# Patient Record
Sex: FEMALE | Race: OTHER
[De-identification: names, ages, dates, MRNs, and addresses within clinical notes are randomized per-mention and may not be internally consistent; named-entity substitution may affect disease eponyms.]

---

## 2017-12-30 ENCOUNTER — HOSPITAL ENCOUNTER (EMERGENCY)
Dept: HOSPITAL 50 - VM.ED | Age: 47
Discharge: HOME | End: 2017-12-30
Payer: MEDICAID

## 2017-12-30 VITALS — SYSTOLIC BLOOD PRESSURE: 162 MMHG | DIASTOLIC BLOOD PRESSURE: 97 MMHG

## 2017-12-30 DIAGNOSIS — M47.894: ICD-10-CM

## 2017-12-30 DIAGNOSIS — M54.2: ICD-10-CM

## 2017-12-30 DIAGNOSIS — Z88.8: ICD-10-CM

## 2017-12-30 DIAGNOSIS — Z79.82: ICD-10-CM

## 2017-12-30 DIAGNOSIS — G89.18: Primary | ICD-10-CM

## 2017-12-30 DIAGNOSIS — F32.9: ICD-10-CM

## 2017-12-30 DIAGNOSIS — I10: ICD-10-CM

## 2017-12-30 DIAGNOSIS — Z88.5: ICD-10-CM

## 2017-12-30 DIAGNOSIS — Z79.899: ICD-10-CM

## 2017-12-30 DIAGNOSIS — F17.210: ICD-10-CM

## 2017-12-30 PROCEDURE — 72128 CT CHEST SPINE W/O DYE: CPT

## 2017-12-30 PROCEDURE — 96375 TX/PRO/DX INJ NEW DRUG ADDON: CPT

## 2017-12-30 PROCEDURE — 96374 THER/PROPH/DIAG INJ IV PUSH: CPT

## 2017-12-30 PROCEDURE — 96361 HYDRATE IV INFUSION ADD-ON: CPT

## 2017-12-30 PROCEDURE — 99284 EMERGENCY DEPT VISIT MOD MDM: CPT

## 2017-12-30 NOTE — EDM.PDOC
ED HPI GENERAL MEDICAL PROBLEM





- General


Chief Complaint: General


Stated Complaint: Headache; Neck Pain; s/p surgery


Time Seen by Provider: 12/30/17 17:57


Source of Information: Reports: Patient, Old Records, RN, RN Notes Reviewed


History Limitations: Reports: No Limitations





- History of Present Illness


INITIAL COMMENTS - FREE TEXT/NARRATIVE: 


Patient presents to the ED at [facility] complaining of nausea, neck 

numbness, left facial numbness, and headache. Patient recently underwent 

surgery on 12/21/2017 for Grade three and four glenohumeral arthritis with 

loose bodies. According to old records, surgery was uneventful. Patient states 

her symptoms began yesterday sometime and have progressively gotten worse. 

Patient admits to smoking Marijuana and doing Meth today. 


Onset: Gradual


Duration: Getting Worse


  ** Left Neck


Pain Score (Numeric/FACES): 8





- Related Data


 Allergies











Allergy/AdvReac Type Severity Reaction Status Date / Time


 


buspirone [From BuSpar] Allergy  Seizure Verified 12/30/17 18:01


 


tramadol Allergy  Seizure Verified 12/30/17 18:01











Home Meds: 


 Home Meds





Aspirin 81 mg PO DAILY 12/30/17 [History]


Divalproex Sodium 250 mg PO DAILY 12/30/17 [History]


Estradiol [Estradiol] 1 patch TOP WEEKLY 12/30/17 [History]


Hydrocodone/Acetaminophen [Hydrocodon-Acetaminophen 5-325] 1 each PO Q4H PRN 12/ 30/17 [History]


Ibuprofen [Motrin] 800 mg PO Q8H PRN 12/30/17 [History]


Ondansetron HCl [Ondansetron] 4 mg PO Q6H PRN 12/30/17 [History]


QUEtiapine [SEROquel] 50 - 100 mg PO BEDTIME 12/30/17 [History]


amLODIPine/Valsartan [Amlodipine-Valsartan 5-160 mg] 1 tab PO DAILY 12/30/17 [

History]


hydrOXYzine Pamoate [Hydroxyzine Pamoate] 25 mg PO Q6H PRN 12/30/17 [History]











Past Medical History


Cardiovascular History: Reports: Hypertension


Psychiatric History: Reports: Anxiety, Depression





- Past Surgical History


Female  Surgical History: Reports: Hysterectomy





Social & Family History





- Family History


Family Medical History: Noncontributory





- Tobacco Use


Smoking Status *Q: Current Every Day Smoker


Years of Tobacco use: 34


Packs/Tins Daily: 1





- Caffeine Use


Caffeine Use: Reports: None





- Recreational Drug Use


Recreational Drug Use: Yes


Drug Use in Last 12 Months: Yes


Recreational Drug Type: Reports: Marijuana/Hashish, Methamphetamine


Recreational Drug Use Frequency: Weekly





ED ROS GENERAL





- Review of Systems


Review Of Systems: See Below


Constitutional: Denies: Fever, Chills, Weakness


Respiratory: Denies: Shortness of Breath, Cough


Cardiovascular: Denies: Chest Pain, Palpitations


GI/Abdominal: Reports: Nausea.  Denies: Vomiting


Musculoskeletal: Reports: Neck Pain, Shoulder Pain, Muscle Pain, Other (See HPI)


Skin: Reports: No Symptoms


Neurological: Reports: Headache, Numbness.  Denies: Dizziness, Paresthesia, 

Tingling





ED EXAM, GENERAL





- Physical Exam


Exam: See Below


Exam Limited By: No Limitations


General Appearance: Alert, No Apparent Distress, Thin, Cachetic


Eye Exam: Bilateral Eye: EOMI, Normal Inspection, PERRL


Ears: Normal External Exam, Normal Canal, Normal TMs


Ear Exam: Bilateral Ear: TM normal


Head: Atraumatic, Normocephalic


Neck: Supple, Limited Range of Motion (due to pain), Tender Lateral (Left)


Respiratory/Chest: No Respiratory Distress, Lungs Clear, Normal Breath Sounds


Cardiovascular: Normal Peripheral Pulses, Regular Rate, Rhythm


Peripheral Pulses: 2+: Radial (L), Radial (R)


GI/Abdominal: Normal Bowel Sounds, Soft, Non-Tender


Back Exam: Normal Inspection, Paraspinal Tenderness


Extremities: Normal Inspection


Neurological: Alert, Oriented, Normal Cognition


Skin Exam: Warm, Dry, Intact, Normal Color, No Rash





Course





- Vital Signs


Last Recorded V/S: 


 Last Vital Signs











Temp  36.0 C   12/30/17 17:56


 


Pulse  80   12/30/17 17:56


 


Resp  20   12/30/17 17:56


 


BP  145/93 H  12/30/17 19:50


 


Pulse Ox  100   12/30/17 17:56














- Orders/Labs/Meds


Orders: 


 Active Orders 24 hr











 Category Date Time Status


 


 Cervical Spine wo Cont [CT] Stat Exams  12/30/17 18:09 Ordered


 


 Head wo Cont [CT] Stat Exams  12/30/17 18:09 Ordered


 


 Thoracic Spine wo Cont [CT] Stat Exams  12/30/17 18:09 Taken


 


 Sodium Chloride 0.9% [Saline Flush] Med  12/30/17 18:25 Active





 10 ml FLUSH ASDIRECTED PRN   


 


 Peripheral IV Insertion Adult [OM.PC] Routine Oth  12/30/17 18:25 Ordered








 Medication Orders





Sodium Chloride (Saline Flush)  10 ml FLUSH ASDIRECTED PRN


   PRN Reason: Keep Vein Open








Meds: 


Medications











Generic Name Dose Route Start Last Admin





  Trade Name Freq  PRN Reason Stop Dose Admin


 


Sodium Chloride  10 ml  12/30/17 18:25  





  Saline Flush  FLUSH   





  ASDIRECTED PRN   





  Keep Vein Open   














Discontinued Medications














Generic Name Dose Route Start Last Admin





  Trade Name Freq  PRN Reason Stop Dose Admin


 


Clonidine HCl  0.1 mg  12/30/17 18:38  12/30/17 18:42





  Catapres  PO  12/30/17 18:39  0.1 mg





  ONETIME ONE   Administration


 


Enalaprilat  1.25 mg  12/30/17 18:38  12/30/17 18:43





  Vasotec Iv  IVPUSH  12/30/17 18:39  1.25 mg





  ONETIME ONE   Administration


 


Lactated Ringer's  1,000 mls @ 999 mls/hr  12/30/17 18:26  12/30/17 18:33





  Ringers, Lactated  IV  12/30/17 19:26  999 mls/hr





  ONETIME ONE   Administration


 


Ondansetron HCl  4 mg  12/30/17 18:26  12/30/17 18:34





  Zofran  IVPUSH  12/30/17 18:27  4 mg





  ONETIME ONE   Administration


 


Prochlorperazine Edisylate  10 mg  12/30/17 19:43  12/30/17 19:46





  Compazine  IV  12/30/17 19:44  10 mg





  ONETIME ONE   Administration














- Radiology Interpretation


Free Text/Narrative:: 


CT Head: Normal examination of the brain


CT C-Spine: No acute findings in the cervical spine


CT T-Spine: Moderate changes of spondylosis diffusely throughout the thoracic 

spine without significant stenosis





See scanned reports in EMR


CT Results Date: 12/30/17


CT Results Time: 19:38





Departure





- Departure


Time of Disposition: 20:03


Disposition: Home, Self-Care 01


Condition: Good


Clinical Impression: 


 Postoperative pain of extremity








- Discharge Information


Instructions:  Pain Relief Preoperatively and Postoperatively


Referrals: 


Keenan Uriostegui PA-C [Primary Care Provider] - 


Forms:  ED Department Discharge


Additional Instructions: 


1. Stay well hydrated and rest


2. May alternate Tylenol/Advil as needed


3. If you need something stronger for pain, please call your surgeon or Primary 

provider


4. May apply heat/ice to sore uncomfortable areas


5. Keep follow up appointments with surgeon as scheduled


6. Call with any questions/concerns


7. CT scans were all normal





- Problem List Review


Problem List Initiated/Reviewed/Updated: Yes





- My Orders


Last 24 Hours: 


My Active Orders





12/30/17 18:09


Cervical Spine wo Cont [CT] Stat 


Head wo Cont [CT] Stat 


Thoracic Spine wo Cont [CT] Stat 





12/30/17 18:25


Sodium Chloride 0.9% [Saline Flush]   10 ml FLUSH ASDIRECTED PRN 


Peripheral IV Insertion Adult [OM.PC] Routine 














- Assessment/Plan


Last 24 Hours: 


My Active Orders





12/30/17 18:09


Cervical Spine wo Cont [CT] Stat 


Head wo Cont [CT] Stat 


Thoracic Spine wo Cont [CT] Stat 





12/30/17 18:25


Sodium Chloride 0.9% [Saline Flush]   10 ml FLUSH ASDIRECTED PRN 


Peripheral IV Insertion Adult [OM.PC] Routine

## 2018-06-26 ENCOUNTER — HOSPITAL ENCOUNTER (EMERGENCY)
Dept: HOSPITAL 50 - VM.ED | Age: 48
LOS: 1 days | Discharge: HOME | End: 2018-06-27
Payer: MEDICAID

## 2018-06-26 DIAGNOSIS — S20.212A: Primary | ICD-10-CM
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
## 2018-06-26 DIAGNOSIS — I10: ICD-10-CM

## 2018-06-26 DIAGNOSIS — Z79.899: ICD-10-CM

## 2018-06-26 DIAGNOSIS — Z79.82: ICD-10-CM

## 2018-06-26 DIAGNOSIS — Z88.8: ICD-10-CM

## 2018-06-26 PROCEDURE — 99283 EMERGENCY DEPT VISIT LOW MDM: CPT

## 2018-06-26 PROCEDURE — 71101 X-RAY EXAM UNILAT RIBS/CHEST: CPT
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
## 2018-06-27 VITALS — DIASTOLIC BLOOD PRESSURE: 100 MMHG | SYSTOLIC BLOOD PRESSURE: 172 MMHG

## 2019-03-28 ENCOUNTER — HOSPITAL ENCOUNTER (EMERGENCY)
Dept: HOSPITAL 50 - VM.ED | Age: 49
LOS: 1 days | Discharge: OTHER INSTITUTION | End: 2019-03-29
Payer: MEDICAID

## 2019-03-28 DIAGNOSIS — Z79.899: ICD-10-CM

## 2019-03-28 DIAGNOSIS — Z88.8: ICD-10-CM

## 2019-03-28 DIAGNOSIS — Z88.6: ICD-10-CM

## 2019-03-28 DIAGNOSIS — F41.9: ICD-10-CM

## 2019-03-28 DIAGNOSIS — F32.9: ICD-10-CM

## 2019-03-28 DIAGNOSIS — Z79.82: ICD-10-CM

## 2019-03-28 DIAGNOSIS — I10: Primary | ICD-10-CM

## 2019-03-28 PROCEDURE — 80053 COMPREHEN METABOLIC PANEL: CPT

## 2019-03-28 PROCEDURE — 83735 ASSAY OF MAGNESIUM: CPT

## 2019-03-28 PROCEDURE — 93005 ELECTROCARDIOGRAM TRACING: CPT

## 2019-03-28 PROCEDURE — 85025 COMPLETE CBC W/AUTO DIFF WBC: CPT

## 2019-03-28 PROCEDURE — 84443 ASSAY THYROID STIM HORMONE: CPT

## 2019-03-28 PROCEDURE — 84100 ASSAY OF PHOSPHORUS: CPT

## 2019-03-28 PROCEDURE — 84484 ASSAY OF TROPONIN QUANT: CPT

## 2019-03-28 PROCEDURE — 99284 EMERGENCY DEPT VISIT MOD MDM: CPT

## 2019-03-29 VITALS — DIASTOLIC BLOOD PRESSURE: 109 MMHG | SYSTOLIC BLOOD PRESSURE: 173 MMHG

## 2019-03-29 LAB
ANION GAP SERPL CALC-SCNC: 12.5 MMOL/L (ref 10–20)
CHLORIDE SERPL-SCNC: 106 MMOL/L (ref 98–107)
SODIUM SERPL-SCNC: 144 MMOL/L (ref 136–145)

## 2019-03-29 NOTE — EDM.PDOC
ED HPI GENERAL MEDICAL PROBLEM





- General


Chief Complaint: General


Stated Complaint: Hypertension, medical screening


Time Seen by Provider: 03/28/19 22:45


Source of Information: Reports: Patient


History Limitations: Reports: No Limitations





- History of Present Illness


INITIAL COMMENTS - FREE TEXT/NARRATIVE: 





Pt. was arrested tonight. She was found to be hypertensive on intake to the 

halfway and was sent to ER. Pt. states that her blood pressure always runs this 

high, often greater than 180/100 at home. She states that she uses 

methamphetamine regularly. Denies any headache. No chest pain or shortness of 

breath. No nausea, vomiting, or diarrhea.


She states that she took her medications this AM but states that she often 

forgets to take them. She is currently prescribed propranolol and triamterene/

HCTZ.


Pt. states that she has no complaints.


Onset Date: 03/28/19


  ** left lateral neck


Pain Score (Numeric/FACES): 5





- Related Data


 Allergies











Allergy/AdvReac Type Severity Reaction Status Date / Time


 


buspirone [From BuSpar] Allergy  Seizure Verified 03/29/19 01:19


 


tramadol Allergy  Seizure Verified 03/29/19 01:19











Home Meds: 


 Home Meds





Aspirin 81 mg PO DAILY 12/30/17 [History]


Divalproex Sodium 250 mg PO DAILY 12/30/17 [History]


Estradiol 1 patch TOP WEEKLY 12/30/17 [History]


Hydrocodone/Acetaminophen [Hydrocodon-Acetaminophen 5-325] 1 each PO Q4H PRN 12/ 30/17 [History]


Ibuprofen [Motrin] 800 mg PO Q8H PRN 12/30/17 [History]


Ondansetron HCl [Ondansetron] 4 mg PO Q6H PRN 12/30/17 [History]


QUEtiapine [SEROquel] 50 - 100 mg PO BEDTIME 12/30/17 [History]


amLODIPine/Valsartan [Amlodipine-Valsartan 5-160 mg] 1 tab PO DAILY 12/30/17 [

History]


hydrOXYzine pamoate [Hydroxyzine Pamoate] 25 mg PO Q6H PRN 12/30/17 [History]











Past Medical History


Cardiovascular History: Reports: Hypertension


Psychiatric History: Reports: Addiction, Anxiety, Depression


Other Psychiatric History: Drug abuse





- Past Surgical History


Female  Surgical History: Reports: Hysterectomy





Social & Family History





- Family History


Family Medical History: Noncontributory





- Tobacco Use


Smoking Status *Q: Unknown Ever Smoked





- Caffeine Use


Caffeine Use: Reports: None





- Recreational Drug Use


Recreational Drug Use: Yes


Drug Use in Last 12 Months: Yes


Recreational Drug Type: Reports: Methamphetamine


Other Recreational Drug Type: Drug abuse/meth





ED ROS GENERAL





- Review of Systems


Review Of Systems: See Below


Constitutional: Reports: No Symptoms


HEENT: Reports: No Symptoms


Respiratory: Reports: No Symptoms


Cardiovascular: Reports: Blood Pressure Problem


Endocrine: Reports: No Symptoms


GI/Abdominal: Reports: No Symptoms


: Reports: No Symptoms


Musculoskeletal: Reports: No Symptoms


Skin: Reports: No Symptoms


Neurological: Reports: No Symptoms


Psychiatric: Reports: No Symptoms


Hematologic/Lymphatic: Reports: No Symptoms


Immunologic: Reports: No Symptoms





ED EXAM, GENERAL





- Physical Exam


Exam: See Below


Exam Limited By: No Limitations


General Appearance: Alert, WD/WN, No Apparent Distress


Eye Exam: Bilateral Eye: EOMI, Normal Fundi, Normal Inspection, PERRL


Throat/Mouth: Normal Inspection, Normal Lips, Normal Teeth, Normal Gums, Normal 

Oropharynx, Normal Voice, No Airway Compromise


Head: Atraumatic, Normocephalic


Neck: Normal Inspection, Supple, Non-Tender, Full Range of Motion


Respiratory/Chest: No Respiratory Distress, Lungs Clear, Normal Breath Sounds, 

No Accessory Muscle Use, Chest Non-Tender


Cardiovascular: Normal Peripheral Pulses, Regular Rate, Rhythm, No Edema, No 

Gallop, No JVD, No Murmur, No Rub


GI/Abdominal: Normal Bowel Sounds, Soft, Non-Tender, No Organomegaly, No 

Distention, No Abnormal Bruit, No Mass


Extremities: Normal Inspection, Normal Range of Motion, Non-Tender, Normal 

Capillary Refill, No Pedal Edema


Neurological: Alert, Oriented, CN II-XII Intact, Normal Cognition, Normal Gait, 

Normal Reflexes, No Motor/Sensory Deficits





EKG INTERPRETATION


Rhythm: NSR


Axis: Normal


P-Wave: Present


QRS: Normal


ST-T: Normal


QT: Normal


Comparison: No Change





Course





- Vital Signs


Last Recorded V/S: 





 Last Vital Signs











Temp  36.1 C   03/28/19 23:40


 


Pulse  72   03/29/19 00:30


 


Resp  16   03/28/19 23:40


 


BP  173/109 H  03/29/19 00:30


 


Pulse Ox  97   03/28/19 23:40














- Orders/Labs/Meds


Orders: 





 Active Orders 24 hr











 Category Date Time Status


 


 EKG Documentation Completion [RC] STAT Care  03/28/19 23:09 Active


 


 Peripheral IV Insertion Adult [OM.PC] Routine Oth  03/28/19 23:09 Ordered











Labs: 





 Laboratory Tests











  03/28/19 03/28/19 Range/Units





  23:25 23:25 


 


WBC  6.7   (4.0-10.0)  x10^3/uL


 


RBC  4.59   (4.00-5.50)  x10^6/uL


 


Hgb  13.5   (12.0-16.0)  g/dL


 


Hct  40.9   (33.0-47.0)  %


 


MCV  89.1   (78.0-93.0)  fL


 


MCH  29.4   (26.0-32.0)  pg


 


MCHC  33.0   (32.0-36.0)  g/dL


 


RDW Coeff of Suzette  13.9   (10.0-15.0)  %


 


Plt Count  187   (130-400)  x10^3/uL


 


Neut % (Auto)  67.3   (50.0-80.0)  %


 


Lymph % (Auto)  24.5 L   (25.0-50.0)  %


 


Mono % (Auto)  6.5   (2.0-11.0)  %


 


Eos % (Auto)  1.4   (0.0-4.0)  %


 


Baso % (Auto)  0.3   (0.2-1.2)  %


 


Sodium   144  (136-145)  mmol/L


 


Potassium   3.5  (3.5-5.1)  mmol/L


 


Chloride   106  ()  mmol/L


 


Carbon Dioxide   29  (21-32)  mmol/L


 


Anion Gap   12.5  (10-20)  mmol/L


 


BUN   13  (7-18)  mg/dL


 


Creatinine   0.8  (0.55-1.02)  mg/dL


 


Est Cr Clr Drug Dosing   TNP  


 


Estimated GFR (MDRD)   > 60  


 


Glucose   113 H  ()  mg/dL


 


Calcium   9.1  (8.5-10.1)  mg/dL


 


Corrected Calcium   9.58  (8.5-10.1)  mg/dL


 


Phosphorus   4.0  (2.6-4.7)  mg/dL


 


Magnesium   2.1  (1.8-2.4)  mg/dL


 


Total Bilirubin   0.6  (0.2-1.0)  mg/dL


 


AST   26  (15-37)  U/L


 


ALT   40  (14-59)  U/L


 


Alkaline Phosphatase   109  ()  U/L


 


Troponin I   < 0.017  (<=0.056)  ng/mL


 


Total Protein   7.5  (6.4-8.2)  g/dL


 


Albumin   3.4  (3.4-5.0)  g/dL


 


Globulin   4.1  


 


Albumin/Globulin Ratio   0.83  


 


TSH, Ultra Sensitive   1.979  (0.358-3.74)  uIU/mL











Meds: 





Medications














Discontinued Medications














Generic Name Dose Route Start Last Admin





  Trade Name Freq  PRN Reason Stop Dose Admin


 


Clonidine HCl  0.1 mg  03/28/19 23:15  03/28/19 23:35





  Catapres  PO   0.1 mg





  DAILY NÉSTOR   Administration





     





     





     





     


 


Enalaprilat  1.25 mg  03/28/19 23:10  03/28/19 23:29





  Vasotec Iv  IVPUSH  03/28/19 23:11  1.25 mg





  ONETIME ONE   Administration





     





     





     





     


 


Sodium Chloride  10 ml  03/28/19 23:09  





  Saline Flush  FLUSH   





  ASDIRECTED PRN   





  Keep Vein Open   





     





     





     














- Re-Assessments/Exams


Free Text/Narrative Re-Assessment/Exam: 


Pt. was given 0.1mg clonidine and 1.25mg enalaprilat IV. Blood pressure did 

improve significantly. Will start lisinopril 10mg PO daily and have her follow-

up for recheck within the next week in the clinic. 





Departure





- Departure


Time of Disposition: 00:35


Disposition: DC/Tfer to Other 70


Clinical Impression: 


 Hypertension








- Discharge Information


Instructions:  Hypertension


Referrals: 


Keenan Uriostegui PA-C [Primary Care Provider] - 


Forms:  ED Department Discharge


Additional Instructions: 


Follow-up with PCP as soon as possible.





In addition to your other medications, start lisinopril 10mg once daily





Do not use methamphetamine as it increases your blood pressure





- My Orders


Last 24 Hours: 





My Active Orders





03/28/19 23:09


EKG Documentation Completion [RC] STAT 


Peripheral IV Insertion Adult [OM.PC] Routine 














- Assessment/Plan


Last 24 Hours: 





My Active Orders





03/28/19 23:09


EKG Documentation Completion [RC] STAT 


Peripheral IV Insertion Adult [OM.PC] Routine 











Plan: 





Follow-up with PCP as soon as possible.





In addition to your other medications, start lisinopril 10mg once daily





Do not use methamphetamine as it increases your blood pressure

## 2019-04-17 ENCOUNTER — HOSPITAL ENCOUNTER (EMERGENCY)
Dept: HOSPITAL 50 - VM.ED | Age: 49
Discharge: HOME | End: 2019-04-17
Payer: MEDICAID

## 2019-04-17 VITALS — SYSTOLIC BLOOD PRESSURE: 160 MMHG | DIASTOLIC BLOOD PRESSURE: 101 MMHG

## 2019-04-17 DIAGNOSIS — I10: ICD-10-CM

## 2019-04-17 DIAGNOSIS — Z79.899: ICD-10-CM

## 2019-04-17 DIAGNOSIS — F17.210: ICD-10-CM

## 2019-04-17 DIAGNOSIS — F32.9: ICD-10-CM

## 2019-04-17 DIAGNOSIS — Z88.6: ICD-10-CM

## 2019-04-17 DIAGNOSIS — F41.9: ICD-10-CM

## 2019-04-17 DIAGNOSIS — Z88.8: ICD-10-CM

## 2019-04-17 DIAGNOSIS — F15.10: Primary | ICD-10-CM

## 2019-04-17 LAB
ANION GAP SERPL CALC-SCNC: 12.3 MMOL/L (ref 10–20)
CHLORIDE SERPL-SCNC: 108 MMOL/L (ref 98–107)
SODIUM SERPL-SCNC: 145 MMOL/L (ref 136–145)

## 2019-04-17 PROCEDURE — 84100 ASSAY OF PHOSPHORUS: CPT

## 2019-04-17 PROCEDURE — 93005 ELECTROCARDIOGRAM TRACING: CPT

## 2019-04-17 PROCEDURE — 85025 COMPLETE CBC W/AUTO DIFF WBC: CPT

## 2019-04-17 PROCEDURE — 36415 COLL VENOUS BLD VENIPUNCTURE: CPT

## 2019-04-17 PROCEDURE — 86140 C-REACTIVE PROTEIN: CPT

## 2019-04-17 PROCEDURE — 80305 DRUG TEST PRSMV DIR OPT OBS: CPT

## 2019-04-17 PROCEDURE — 81001 URINALYSIS AUTO W/SCOPE: CPT

## 2019-04-17 PROCEDURE — 99283 EMERGENCY DEPT VISIT LOW MDM: CPT

## 2019-04-17 PROCEDURE — G0480 DRUG TEST DEF 1-7 CLASSES: HCPCS

## 2019-04-17 PROCEDURE — 84484 ASSAY OF TROPONIN QUANT: CPT

## 2019-04-17 PROCEDURE — 80053 COMPREHEN METABOLIC PANEL: CPT

## 2019-04-17 PROCEDURE — 85610 PROTHROMBIN TIME: CPT

## 2019-04-17 PROCEDURE — 83735 ASSAY OF MAGNESIUM: CPT

## 2019-04-17 NOTE — EDM.PDOC
ED HPI GENERAL MEDICAL PROBLEM





- General


Chief Complaint: General


Time Seen by Provider: 04/17/19 01:30


Source of Information: Reports: Patient


History Limitations: Reports: No Limitations





- History of Present Illness


INITIAL COMMENTS - FREE TEXT/NARRATIVE: 





Pt. presents to ER with complaints of fatigue, hypertension, and generally not 

feeling well. Pt. states that she has been experiencing this for several weeks. 

She was seen in the ER earlier this month to be cleared for incarceration and 

was found to be extremely hypertensive. At that time she reported that she uses 

meth regularly and states that she has been taking her blood pressure 

medications irregularly. She was started on Lisinopril in addition to her 

Propranolol ER 60mg and Triamterene/HCTZ 37.5/25mg. She states that she has 

been taking her medications regularly but states that her blood pressure has 

been very high the past few days. Her BP at home is 214/139 yesterday. 


Denies any chest pain or shortness of breath. No headache. No nausea, vomiting, 

or diarrhea. No numbness/tingling in her extremities. She now denies any drug 

or alcohol use.


Denies any recent illness.


Onset: Today


Onset Date: 04/17/19


Location: Reports: Generalized





- Related Data


 Allergies











Allergy/AdvReac Type Severity Reaction Status Date / Time


 


buspirone [From BuSpar] Allergy  Seizure Verified 04/17/19 01:25


 


tramadol Allergy  Seizure Verified 04/17/19 01:25











Home Meds: 


 Home Meds





Ibuprofen [Motrin] 800 mg PO Q8H PRN 12/30/17 [History]


Ondansetron HCl [Ondansetron] 4 mg PO Q6H PRN 12/30/17 [History]


QUEtiapine [SEROquel] 50 - 100 mg PO BEDTIME 12/30/17 [History]


ClonazePAM [KlonoPIN] 0.5 - 1 mg PO BID PRN 04/17/19 [History]


Eszopiclone [Lunesta] 3 mg PO BEDTIME 04/17/19 [History]


Meloxicam [Mobic] 7.5 mg PO DAILY 04/17/19 [History]


Omeprazole Magnesium [Prilosec Otc] 40 mg PO BID 04/17/19 [History]


Propranolol [Inderal LA 24 Hr] 60 mg PO DAILY 04/17/19 [History]


Ranitidine [Zantac] 150 mg PO BEDTIME 04/17/19 [History]


Triamterene/Hydrochlorothiazid [Dyazide 37.5-25] 1 cap PO DAILY 04/17/19 [

History]











Past Medical History


Cardiovascular History: Reports: Hypertension


Psychiatric History: Reports: Addiction, Anxiety, Depression


Other Psychiatric History: Drug abuse





- Past Surgical History


Female  Surgical History: Reports: Hysterectomy





Social & Family History





- Family History


Family Medical History: Noncontributory





- Tobacco Use


Smoking Status *Q: Current Every Day Smoker


Years of Tobacco use: 35


Packs/Tins Daily: 1





- Caffeine Use


Caffeine Use: Reports: None





- Recreational Drug Use


Recreational Drug Use: No





ED ROS GENERAL





- Review of Systems


Review Of Systems: See Below


Constitutional: Reports: No Symptoms


HEENT: Reports: No Symptoms


Respiratory: Reports: No Symptoms


Cardiovascular: Reports: No Symptoms


Endocrine: Reports: No Symptoms


GI/Abdominal: Reports: No Symptoms


: Reports: No Symptoms


Musculoskeletal: Reports: No Symptoms


Skin: Reports: No Symptoms


Neurological: Reports: Dizziness, Weakness.  Denies: Headache, Numbness, 

Paresthesia


Psychiatric: Reports: No Symptoms


Hematologic/Lymphatic: Reports: No Symptoms


Immunologic: Reports: No Symptoms





ED EXAM, GENERAL





- Physical Exam


Exam: See Below


Exam Limited By: No Limitations


General Appearance: Alert, WD/WN, No Apparent Distress


Eye Exam: Bilateral Eye: EOMI, PERRL


Head: Atraumatic, Normocephalic


Neck: Normal Inspection, Supple, Non-Tender, Full Range of Motion


Respiratory/Chest: No Respiratory Distress, Lungs Clear, Normal Breath Sounds, 

No Accessory Muscle Use, Chest Non-Tender


Cardiovascular: Normal Peripheral Pulses, Regular Rate, Rhythm, No Edema, No JVD


Peripheral Pulses: 4+: Radial (R)


GI/Abdominal: Soft, Non-Tender, No Organomegaly, No Mass


 (Female) Exam: Deferred


Rectal (Female) Exam: Deferred


Back Exam: Normal Inspection, Full Range of Motion


Extremities: Normal Inspection, Normal Range of Motion, Non-Tender, No Pedal 

Edema, Normal Capillary Refill


Neurological: Alert, Oriented, CN II-XII Intact, Normal Cognition, Normal Gait, 

Normal Reflexes, No Motor/Sensory Deficits


Psychiatric: Normal Affect, Normal Mood


Skin Exam: Warm, Dry, Intact, Normal Color, No Rash


Lymphatic: No Adenopathy





EKG INTERPRETATION


Rhythm: NSR


Axis: Normal


P-Wave: Present


QRS: Normal


ST-T: Normal


QT: Normal





Course





- Vital Signs


Last Recorded V/S: 


 Last Vital Signs











Temp  36.7 C   04/17/19 01:22


 


Pulse  81   04/17/19 01:22


 


Resp  14   04/17/19 01:22


 


BP  165/119 H  04/17/19 01:48


 


Pulse Ox  98   04/17/19 01:22














- Orders/Labs/Meds


Orders: 


 Active Orders 24 hr











 Category Date Time Status


 


 EKG Documentation Completion [RC] STAT Care  04/17/19 01:31 Active


 


 CBC WITH AUTO DIFF [HEME] Stat Lab  04/17/19 01:30 Ordered


 


 COMPREHENSIVE METABOLIC PN,CMP [CHEM] Stat Lab  04/17/19 01:30 Ordered


 


 CRP [C-REACTIVE PROTEIN] [CHEM] Stat Lab  04/17/19 01:30 Ordered


 


 DRUG SCREEN, URINE [URCHEM] Stat Lab  04/17/19 01:54 Ordered


 


 ETOH [ETHANOL BLOOD MEDICAL] [CHEM] Stat Lab  04/17/19 01:37 Ordered


 


 INR,PT,PROTHROMBIN TIME [COAG] Stat Lab  04/17/19 01:30 Ordered


 


 MAGNESIUM [CHEM] Stat Lab  04/17/19 01:30 Ordered


 


 PHOSPHORUS [CHEM] Stat Lab  04/17/19 01:30 Ordered


 


 TROPONIN I [CHEM] Stat Lab  04/17/19 01:30 Ordered


 


 UA W/MICROSCOPIC [URIN] Stat Lab  04/17/19 01:54 Ordered











Meds: 


Medications














Discontinued Medications














Generic Name Dose Route Start Last Admin





  Trade Name Freq  PRN Reason Stop Dose Admin


 


Clonidine HCl  0.1 mg  04/17/19 01:36  04/17/19 01:48





  Catapres  PO  04/17/19 01:37  0.1 mg





  ONETIME ONE   Administration





     





     





     





     














Departure





- Departure


Time of Disposition: 02:38


Disposition: Home, Self-Care 01


Clinical Impression: 


 Methamphetamine abuse, Hypertension








- Discharge Information


Forms:  ED Department Discharge





- My Orders


Last 24 Hours: 


My Active Orders





04/17/19 01:30


CBC WITH AUTO DIFF [HEME] Stat 


COMPREHENSIVE METABOLIC PN,CMP [CHEM] Stat 


CRP [C-REACTIVE PROTEIN] [CHEM] Stat 


INR,PT,PROTHROMBIN TIME [COAG] Stat 


MAGNESIUM [CHEM] Stat 


PHOSPHORUS [CHEM] Stat 


TROPONIN I [CHEM] Stat 





04/17/19 01:31


EKG Documentation Completion [RC] STAT 





04/17/19 01:37


ETOH [ETHANOL BLOOD MEDICAL] [CHEM] Stat 





04/17/19 01:54


DRUG SCREEN, URINE [URCHEM] Stat 


UA W/MICROSCOPIC [URIN] Stat 














- Assessment/Plan


Last 24 Hours: 


My Active Orders





04/17/19 01:30


CBC WITH AUTO DIFF [HEME] Stat 


COMPREHENSIVE METABOLIC PN,CMP [CHEM] Stat 


CRP [C-REACTIVE PROTEIN] [CHEM] Stat 


INR,PT,PROTHROMBIN TIME [COAG] Stat 


MAGNESIUM [CHEM] Stat 


PHOSPHORUS [CHEM] Stat 


TROPONIN I [CHEM] Stat 





04/17/19 01:31


EKG Documentation Completion [RC] STAT 





04/17/19 01:37


ETOH [ETHANOL BLOOD MEDICAL] [CHEM] Stat 





04/17/19 01:54


DRUG SCREEN, URINE [URCHEM] Stat 


UA W/MICROSCOPIC [URIN] Stat 











Plan: 





Pt. did test positive for meth. Advised her to stop using this as it inhibits 

her ability to sleep. She was advised to use take her antihypertensives at the 

same time every day. Discussed sleep hygiene and the importance of getting 8 

hours of sleep, the circadian rhythm, etc. She was advised to follow-up with 

her PCP in 2 weeks for recheck.

## 2019-04-26 ENCOUNTER — HOSPITAL ENCOUNTER (EMERGENCY)
Dept: HOSPITAL 50 - VM.ED | Age: 49
Discharge: HOME | End: 2019-04-26
Payer: MEDICAID

## 2019-04-26 VITALS — SYSTOLIC BLOOD PRESSURE: 158 MMHG | DIASTOLIC BLOOD PRESSURE: 107 MMHG

## 2019-04-26 DIAGNOSIS — Z79.899: ICD-10-CM

## 2019-04-26 DIAGNOSIS — F17.210: ICD-10-CM

## 2019-04-26 DIAGNOSIS — Z88.8: ICD-10-CM

## 2019-04-26 DIAGNOSIS — F41.9: ICD-10-CM

## 2019-04-26 DIAGNOSIS — Z88.5: ICD-10-CM

## 2019-04-26 DIAGNOSIS — F32.9: ICD-10-CM

## 2019-04-26 DIAGNOSIS — I10: Primary | ICD-10-CM

## 2019-04-26 LAB
ANION GAP SERPL CALC-SCNC: 12.3 MMOL/L (ref 10–20)
CHLORIDE SERPL-SCNC: 104 MMOL/L (ref 98–107)
SODIUM SERPL-SCNC: 144 MMOL/L (ref 136–145)

## 2019-04-26 PROCEDURE — 83735 ASSAY OF MAGNESIUM: CPT

## 2019-04-26 PROCEDURE — 80305 DRUG TEST PRSMV DIR OPT OBS: CPT

## 2019-04-26 PROCEDURE — 99284 EMERGENCY DEPT VISIT MOD MDM: CPT

## 2019-04-26 PROCEDURE — 80053 COMPREHEN METABOLIC PANEL: CPT

## 2019-04-26 PROCEDURE — 84443 ASSAY THYROID STIM HORMONE: CPT

## 2019-04-26 PROCEDURE — 93005 ELECTROCARDIOGRAM TRACING: CPT

## 2019-04-26 PROCEDURE — 83880 ASSAY OF NATRIURETIC PEPTIDE: CPT

## 2019-04-26 PROCEDURE — 85610 PROTHROMBIN TIME: CPT

## 2019-04-26 PROCEDURE — 81001 URINALYSIS AUTO W/SCOPE: CPT

## 2019-04-26 PROCEDURE — 84484 ASSAY OF TROPONIN QUANT: CPT

## 2019-04-26 PROCEDURE — G0480 DRUG TEST DEF 1-7 CLASSES: HCPCS

## 2019-04-26 PROCEDURE — 86140 C-REACTIVE PROTEIN: CPT

## 2019-04-26 PROCEDURE — 96374 THER/PROPH/DIAG INJ IV PUSH: CPT

## 2019-04-26 PROCEDURE — 84100 ASSAY OF PHOSPHORUS: CPT

## 2019-04-26 PROCEDURE — 85025 COMPLETE CBC W/AUTO DIFF WBC: CPT

## 2019-04-26 NOTE — EDM.PDOC
ED HPI GENERAL MEDICAL PROBLEM





- General


Chief Complaint: Cardiovascular Problem


Time Seen by Provider: 04/26/19 11:10


Source of Information: Reports: Patient


History Limitations: Reports: No Limitations





- History of Present Illness


INITIAL COMMENTS - FREE TEXT/NARRATIVE: 





Pt. presents to ER with complaints of depression, suicidal thoughts without plan

, fatigue, and hypertension. She was seen in ER for fatigue and hypertension on 

4/17 and was found to be extremely hypertensive at that time. She had been non-

compliant with her medications at that time and tested positive for 

methamphetamine which she affirms she did not take. She states today that she 

thinks her daughter has been putting meth in her coffee. 


Pt. states that she has been short of breath. She complains of chest pain and 

headache, body aches.


She states that she has been taking one of her medications regularly, she 

thinks the triamterene/HCTZ. She has not been taking the propranolol or 

lisinopril. She was seen in the clinic on the 24th and her BP at that time was 

152/104.


She has had issues with insomnia. She is sleepy during the day and often awake 

at night due to stress with her daughter and boyfriend. She states that her 

daughter has been trading sex for drugs with her boyfriend. Her daughter has 

since been kicked out of the house which has been stressful for her.


Pt. contacted the [facility] [place] and one of their case 

workers accompanies the patient to ER today. She is arranging for her to stay 

at the [facility] in [place] until things have stabilized around home. She will also 

be put in contact with mental health professionals to address her depression 

and anxiety.


Onset: Today


Onset Date: 04/26/19


  ** Headache


Pain Score (Numeric/FACES): 9





- Related Data


 Allergies











Allergy/AdvReac Type Severity Reaction Status Date / Time


 


buspirone [From BuSpar] Allergy  Seizure Verified 04/26/19 11:38


 


tramadol Allergy  Seizure Verified 04/26/19 11:38











Home Meds: 


 Home Meds





Ibuprofen [Motrin] 800 mg PO Q8H PRN 12/30/17 [History]


Ondansetron HCl [Ondansetron] 4 mg PO Q6H PRN 12/30/17 [History]


QUEtiapine [SEROquel] 50 - 100 mg PO BEDTIME 12/30/17 [History]


ClonazePAM [KlonoPIN] 0.5 - 1 mg PO BID PRN 04/17/19 [History]


Eszopiclone [Lunesta] 3 mg PO BEDTIME 04/17/19 [History]


Meloxicam [Mobic] 7.5 mg PO DAILY 04/17/19 [History]


Omeprazole Magnesium [Prilosec Otc] 40 mg PO BID 04/17/19 [History]


Propranolol [Inderal LA 24 Hr] 60 mg PO DAILY 04/17/19 [History]


Ranitidine [Zantac] 150 mg PO BEDTIME 04/17/19 [History]


Triamterene/Hydrochlorothiazid [Dyazide 37.5-25] 1 cap PO DAILY 04/17/19 [

History]











Past Medical History


Cardiovascular History: Reports: Hypertension


Psychiatric History: Reports: Addiction, Anxiety, Depression


Other Psychiatric History: Drug abuse





- Past Surgical History


Female  Surgical History: Reports: Hysterectomy





Social & Family History





- Family History


Family Medical History: Noncontributory





- Tobacco Use


Smoking Status *Q: Current Every Day Smoker


Years of Tobacco use: 35


Packs/Tins Daily: 0.5





- Caffeine Use


Caffeine Use: Reports: None





- Recreational Drug Use


Recreational Drug Use: No





ED ROS GENERAL





- Review of Systems


Review Of Systems: See Below


Constitutional: Reports: Malaise, Fatigue, Decreased Appetite


HEENT: Reports: No Symptoms


Respiratory: Reports: No Symptoms


Cardiovascular: Reports: No Symptoms


Endocrine: Reports: No Symptoms


GI/Abdominal: Reports: No Symptoms


: Reports: No Symptoms


Musculoskeletal: Reports: No Symptoms


Skin: Reports: No Symptoms


Neurological: Reports: Headache.  Denies: Paresthesia, Seizure, Syncope


Psychiatric: Reports: Depression, Suicidal Ideation


Hematologic/Lymphatic: Reports: No Symptoms


Immunologic: Reports: No Symptoms





ED EXAM, GENERAL





- Physical Exam


Exam: See Below


Exam Limited By: No Limitations


General Appearance: Alert, WD/WN, No Apparent Distress


Neck: Normal Inspection, Supple, Non-Tender, Full Range of Motion


Respiratory/Chest: No Respiratory Distress, Lungs Clear, Normal Breath Sounds, 

No Accessory Muscle Use, Chest Non-Tender


Cardiovascular: Normal Peripheral Pulses, Regular Rate, Rhythm, No Edema, No 

Gallop, No JVD, No Murmur, No Rub


Peripheral Pulses: 4+: Radial (R)


GI/Abdominal: Soft, Non-Tender, No Organomegaly, No Distention, No Mass


 (Female) Exam: Deferred


Rectal (Female) Exam: Deferred


Back Exam: Normal Inspection, Full Range of Motion


Extremities: Normal Inspection, Normal Range of Motion, Non-Tender, No Pedal 

Edema, Normal Capillary Refill


Neurological: Alert, Oriented, CN II-XII Intact, Normal Cognition, Normal Gait, 

Normal Reflexes, No Motor/Sensory Deficits


Psychiatric: Normal Affect, Normal Mood


Skin Exam: Warm, Dry, Intact, Normal Color, No Rash





Course





- Vital Signs


Last Recorded V/S: 


 Last Vital Signs











Temp  36.0 C   04/26/19 11:00


 


Pulse  74   04/26/19 11:00


 


Resp  16   04/26/19 11:00


 


BP  171/105 H  04/26/19 12:26


 


Pulse Ox  99   04/26/19 11:00














- Orders/Labs/Meds


Orders: 


 Active Orders 24 hr











 Category Date Time Status


 


 EKG Documentation Completion [RC] STAT Care  04/26/19 11:11 Active


 


 Sodium Chloride 0.9% [Saline Flush] Med  04/26/19 11:11 Active





 10 ml FLUSH ASDIRECTED PRN   


 


 Peripheral IV Insertion Adult [OM.PC] Routine Oth  04/26/19 11:11 Ordered








 Medication Orders





Sodium Chloride (Saline Flush)  10 ml FLUSH ASDIRECTED PRN


   PRN Reason: Keep Vein Open








Labs: 


 Laboratory Tests











  04/26/19 04/26/19 04/26/19 Range/Units





  11:10 11:10 11:10 


 


WBC  8.8    (4.0-10.0)  x10^3/uL


 


RBC  4.82    (4.00-5.50)  x10^6/uL


 


Hgb  14.2  D    (12.0-16.0)  g/dL


 


Hct  43.0    (33.0-47.0)  %


 


MCV  89.2    (78.0-93.0)  fL


 


MCH  29.5    (26.0-32.0)  pg


 


MCHC  33.0    (32.0-36.0)  g/dL


 


RDW Coeff of Suzette  13.5    (10.0-15.0)  %


 


Plt Count  213    (130-400)  x10^3/uL


 


Neut % (Auto)  73.0    (50.0-80.0)  %


 


Lymph % (Auto)  20.3 L    (25.0-50.0)  %


 


Mono % (Auto)  4.5    (2.0-11.0)  %


 


Eos % (Auto)  1.9    (0.0-4.0)  %


 


Baso % (Auto)  0.3    (0.2-1.2)  %


 


PT   9.6 L   (10.0-12.8)  SEC


 


INR   0.8 L   (2.0-3.5)  


 


Sodium    144  (136-145)  mmol/L


 


Potassium    3.3 L  (3.5-5.1)  mmol/L


 


Chloride    104  ()  mmol/L


 


Carbon Dioxide    31  (21-32)  mmol/L


 


Anion Gap    12.3  (10-20)  mmol/L


 


BUN    21 H  (7-18)  mg/dL


 


Creatinine    0.8  (0.55-1.02)  mg/dL


 


Est Cr Clr Drug Dosing    87.15  mL/min


 


Estimated GFR (MDRD)    > 60  


 


Glucose    82  ()  mg/dL


 


Calcium    8.5  (8.5-10.1)  mg/dL


 


Corrected Calcium    8.90  (8.5-10.1)  mg/dL


 


Phosphorus    3.3  (2.6-4.7)  mg/dL


 


Magnesium    2.0  (1.8-2.4)  mg/dL


 


Total Bilirubin    0.5  (0.2-1.0)  mg/dL


 


AST    20  (15-37)  U/L


 


ALT    33  (14-59)  U/L


 


Alkaline Phosphatase    110  ()  U/L


 


Troponin I    < 0.017  (<=0.056)  ng/mL


 


C-Reactive Protein    0.3  (<=0.9)  mg/dL


 


NT-Pro-B Natriuret Pep    172 H  (<=125)  pg/mL


 


Total Protein    8.4 H  (6.4-8.2)  g/dL


 


Albumin    3.5  (3.4-5.0)  g/dL


 


Globulin    4.9  


 


Albumin/Globulin Ratio    0.71  


 


TSH, Ultra Sensitive    0.797  (0.358-3.74)  uIU/mL


 


Urine Color     (YELLOW)  


 


Urine Appearance     (CLEAR)  


 


Urine pH     (5.0-8.0)  


 


Ur Specific Gravity     


 


Urine Protein     (NEGATIVE)  mg/dL


 


Urine Glucose (UA)     (NEGATIVE)  mg/dL


 


Urine Ketones     (NEGATIVE)  mg/dL


 


Urine Occult Blood     (NEGATIVE)  


 


Urine Nitrite     (NEGATIVE)  


 


Urine Bilirubin     (NEGATIVE)  


 


Urine Urobilinogen     (0.2)  EU/dL


 


Ur Leukocyte Esterase     (NEGATIVE)  


 


Urine RBC     (NOT SEEN)  /HPF


 


Urine WBC     (NOT SEEN)  /HPF


 


Ur Squamous Epith Cells     (NEGATIVE)  /HPF


 


Urine Bacteria     (NEGATIVE)  /HPF


 


Urine Mucus     (NEGATIVE)  /LPF


 


Urine Opiates Screen     (NEGATIVE)  


 


Ur Buprenorphine Scrn     (NEGATIVE)  


 


Ur Oxycodone Screen     (NEGATIVE)  


 


Urine Methadone Screen     (NEGATIVE)  


 


Ur Barbiturates Screen     (NEGATIVE)  


 


Ur Tricyclics Screen     (NEGATIVE)  


 


Ur Amphetamine Screen     (NEGATIVE)  


 


U Methamphetamines Scrn     (NEGATIVE)  


 


Urine MDMA Screen     (NEGATIVE)  


 


U Benzodiazepines Scrn     (NEGATIVE)  


 


U Cocaine Metab Screen     (NEGATIVE)  


 


U Marijuana (THC) Screen     (NEGATIVE)  


 


Ethyl Alcohol    < 3  (0-3)  mg/dL














  04/26/19 04/26/19 Range/Units





  11:10 11:20 


 


WBC    (4.0-10.0)  x10^3/uL


 


RBC    (4.00-5.50)  x10^6/uL


 


Hgb    (12.0-16.0)  g/dL


 


Hct    (33.0-47.0)  %


 


MCV    (78.0-93.0)  fL


 


MCH    (26.0-32.0)  pg


 


MCHC    (32.0-36.0)  g/dL


 


RDW Coeff of Suzette    (10.0-15.0)  %


 


Plt Count    (130-400)  x10^3/uL


 


Neut % (Auto)    (50.0-80.0)  %


 


Lymph % (Auto)    (25.0-50.0)  %


 


Mono % (Auto)    (2.0-11.0)  %


 


Eos % (Auto)    (0.0-4.0)  %


 


Baso % (Auto)    (0.2-1.2)  %


 


PT    (10.0-12.8)  SEC


 


INR    (2.0-3.5)  


 


Sodium    (136-145)  mmol/L


 


Potassium    (3.5-5.1)  mmol/L


 


Chloride    ()  mmol/L


 


Carbon Dioxide    (21-32)  mmol/L


 


Anion Gap    (10-20)  mmol/L


 


BUN    (7-18)  mg/dL


 


Creatinine    (0.55-1.02)  mg/dL


 


Est Cr Clr Drug Dosing    mL/min


 


Estimated GFR (MDRD)    


 


Glucose    ()  mg/dL


 


Calcium    (8.5-10.1)  mg/dL


 


Corrected Calcium    (8.5-10.1)  mg/dL


 


Phosphorus    (2.6-4.7)  mg/dL


 


Magnesium    (1.8-2.4)  mg/dL


 


Total Bilirubin    (0.2-1.0)  mg/dL


 


AST    (15-37)  U/L


 


ALT    (14-59)  U/L


 


Alkaline Phosphatase    ()  U/L


 


Troponin I    (<=0.056)  ng/mL


 


C-Reactive Protein    (<=0.9)  mg/dL


 


NT-Pro-B Natriuret Pep    (<=125)  pg/mL


 


Total Protein    (6.4-8.2)  g/dL


 


Albumin    (3.4-5.0)  g/dL


 


Globulin    


 


Albumin/Globulin Ratio    


 


TSH, Ultra Sensitive    (0.358-3.74)  uIU/mL


 


Urine Color  Yellow   (YELLOW)  


 


Urine Appearance  Clear   (CLEAR)  


 


Urine pH  7.0   (5.0-8.0)  


 


Ur Specific Gravity  1.015   


 


Urine Protein  Negative   (NEGATIVE)  mg/dL


 


Urine Glucose (UA)  Negative   (NEGATIVE)  mg/dL


 


Urine Ketones  Negative   (NEGATIVE)  mg/dL


 


Urine Occult Blood  Trace-intact H   (NEGATIVE)  


 


Urine Nitrite  Negative   (NEGATIVE)  


 


Urine Bilirubin  Negative   (NEGATIVE)  


 


Urine Urobilinogen  0.2   (0.2)  EU/dL


 


Ur Leukocyte Esterase  Negative   (NEGATIVE)  


 


Urine RBC  Not seen   (NOT SEEN)  /HPF


 


Urine WBC  0-5   (NOT SEEN)  /HPF


 


Ur Squamous Epith Cells  Few H   (NEGATIVE)  /HPF


 


Urine Bacteria  Rare   (NEGATIVE)  /HPF


 


Urine Mucus  Few H   (NEGATIVE)  /LPF


 


Urine Opiates Screen   Negative  (NEGATIVE)  


 


Ur Buprenorphine Scrn   Negative  (NEGATIVE)  


 


Ur Oxycodone Screen   Negative  (NEGATIVE)  


 


Urine Methadone Screen   Negative  (NEGATIVE)  


 


Ur Barbiturates Screen   Negative  (NEGATIVE)  


 


Ur Tricyclics Screen   Negative  (NEGATIVE)  


 


Ur Amphetamine Screen   Negative  (NEGATIVE)  


 


U Methamphetamines Scrn   Negative  (NEGATIVE)  


 


Urine MDMA Screen   Negative  (NEGATIVE)  


 


U Benzodiazepines Scrn   Positive H  (NEGATIVE)  


 


U Cocaine Metab Screen   Negative  (NEGATIVE)  


 


U Marijuana (THC) Screen   Negative  (NEGATIVE)  


 


Ethyl Alcohol    (0-3)  mg/dL











Meds: 


Medications











Generic Name Dose Route Start Last Admin





  Trade Name Freq  PRN Reason Stop Dose Admin


 


Sodium Chloride  10 ml  04/26/19 11:11  





  Saline Flush  FLUSH   





  ASDIRECTED PRN   





  Keep Vein Open   





     





     





     














Discontinued Medications














Generic Name Dose Route Start Last Admin





  Trade Name Sergioq  PRN Reason Stop Dose Admin


 


Clonidine HCl  0.1 mg  04/26/19 11:19  04/26/19 11:28





  Catapres  PO  04/26/19 11:20  0.1 mg





  ONETIME ONE   Administration





     





     





     





     


 


Labetalol HCl  20 mg  04/26/19 11:19  04/26/19 11:28





  Normodyne  IVPUSH  04/26/19 11:20  20 mg





  NOW ONE   Administration





     





     





  Protocol   





     














Departure





- Departure


Time of Disposition: 12:40


Disposition: DC/[facility] to [facility]


Reason for Transfer *Q: Other


Clinical Impression: 


 Hypertension





Instructions:  Hypertension


Referrals: 


Kenean Uriostegui PA-C [Primary Care Provider] - 


Forms:  ED Department Discharge


Additional Instructions: 


Triamterene/HCTZ 37.5mg/25mg once daily





Propranolol 60mg once daily





Follow-up for blood pressure recheck in 1 week to see if you need to be on a 

third agent for BP.





Work on your sleep pattern as we discussed before. Do not nap during the day. 

Try to go to sleep at the same time every night. Use your lunesta as needed.





- Problem List Review


Problem List Initiated/Reviewed/Updated: Yes





- My Orders


Last 24 Hours: 


My Active Orders





04/26/19 11:11


EKG Documentation Completion [RC] STAT 


Sodium Chloride 0.9% [Saline Flush]   10 ml FLUSH ASDIRECTED PRN 


Peripheral IV Insertion Adult [OM.PC] Routine 














- Assessment/Plan


Last 24 Hours: 


My Active Orders





04/26/19 11:11


EKG Documentation Completion [RC] STAT 


Sodium Chloride 0.9% [Saline Flush]   10 ml FLUSH ASDIRECTED PRN 


Peripheral IV Insertion Adult [OM.PC] Routine 











Plan: 


Pt. will be transported to [facility] in [place].





Triamterene/HCTZ 37.5mg/25mg once daily





Propranolol 60mg once daily





She should follow-up in the clinic in 7-10 days for repeat BP check. I am not 

starting a third agent because she was not taking one if the agents she is 

already prescribed.

## 2020-08-12 ENCOUNTER — HOSPITAL ENCOUNTER (EMERGENCY)
Dept: HOSPITAL 50 - VM.ED | Age: 50
Discharge: HOME | End: 2020-08-12
Payer: MEDICAID

## 2020-08-12 VITALS — SYSTOLIC BLOOD PRESSURE: 158 MMHG | DIASTOLIC BLOOD PRESSURE: 102 MMHG

## 2020-08-12 VITALS — HEART RATE: 81 BPM

## 2020-08-12 DIAGNOSIS — K02.9: ICD-10-CM

## 2020-08-12 DIAGNOSIS — Z20.828: ICD-10-CM

## 2020-08-12 DIAGNOSIS — F32.9: ICD-10-CM

## 2020-08-12 DIAGNOSIS — K05.20: ICD-10-CM

## 2020-08-12 DIAGNOSIS — F15.10: ICD-10-CM

## 2020-08-12 DIAGNOSIS — Z90.710: ICD-10-CM

## 2020-08-12 DIAGNOSIS — F41.9: ICD-10-CM

## 2020-08-12 DIAGNOSIS — Z88.8: ICD-10-CM

## 2020-08-12 DIAGNOSIS — Z79.899: ICD-10-CM

## 2020-08-12 DIAGNOSIS — I10: ICD-10-CM

## 2020-08-12 DIAGNOSIS — Z88.5: ICD-10-CM

## 2020-08-12 DIAGNOSIS — R07.9: Primary | ICD-10-CM

## 2020-08-12 LAB
ANION GAP SERPL CALC-SCNC: 8.2 MMOL/L (ref 10–20)
BARBITURATES UR QL SCN: NEGATIVE
BENZODIAZ UR QL SCN: NEGATIVE
CHLORIDE SERPL-SCNC: 104 MMOL/L (ref 98–107)
EDDP,URINE SCREEN: NEGATIVE
METHADONE UR QL SCN: POSITIVE
SODIUM SERPL-SCNC: 140 MMOL/L (ref 136–145)
TCA SCREEN,URINE: NEGATIVE
THC UR QL SCN>50 NG/ML: POSITIVE

## 2020-08-12 PROCEDURE — 85025 COMPLETE CBC W/AUTO DIFF WBC: CPT

## 2020-08-12 PROCEDURE — 99284 EMERGENCY DEPT VISIT MOD MDM: CPT

## 2020-08-12 PROCEDURE — 80305 DRUG TEST PRSMV DIR OPT OBS: CPT

## 2020-08-12 PROCEDURE — 87635 SARS-COV-2 COVID-19 AMP PRB: CPT

## 2020-08-12 PROCEDURE — U0002 COVID-19 LAB TEST NON-CDC: HCPCS

## 2020-08-12 PROCEDURE — 83735 ASSAY OF MAGNESIUM: CPT

## 2020-08-12 PROCEDURE — 93010 ELECTROCARDIOGRAM REPORT: CPT

## 2020-08-12 PROCEDURE — 81001 URINALYSIS AUTO W/SCOPE: CPT

## 2020-08-12 PROCEDURE — 84484 ASSAY OF TROPONIN QUANT: CPT

## 2020-08-12 PROCEDURE — 85610 PROTHROMBIN TIME: CPT

## 2020-08-12 PROCEDURE — 96374 THER/PROPH/DIAG INJ IV PUSH: CPT

## 2020-08-12 PROCEDURE — 99285 EMERGENCY DEPT VISIT HI MDM: CPT

## 2020-08-12 PROCEDURE — 93005 ELECTROCARDIOGRAM TRACING: CPT

## 2020-08-12 PROCEDURE — 80053 COMPREHEN METABOLIC PANEL: CPT

## 2020-08-12 RX ADMIN — Medication PRN ML: at 16:30

## 2020-08-12 NOTE — EDM.PDOC
ED HPI GENERAL MEDICAL PROBLEM





- General


Stated Complaint: SOB, CHEST PAIN


Time Seen by Provider: 08/12/20 15:59


Source of Information: Reports: Patient


History Limitations: Reports: No Limitations





- History of Present Illness


INITIAL COMMENTS - FREE TEXT/NARRATIVE: 





Pt. presents to ER with numerous complaints. Her primary complaint is of chest 

pain and hypertension. Pt. admits to starting to use methamphetamine again, and 

previously had not been using it. She states that she has been experiencing 

intermittent chest discomfort for over a month. Pt. BP has been elevated in 

clinic (180/100 range). She states that she has not been taking her 

antihypertensive medication for "a long time", neither her inderal or her 

dyazide.


 She denies any headache. Shortness of breath. Denies any nausea or vomiting. 


Pt. is also concerned that she has a dental infection. She has severe caries to 

most of her teeth. Pt. states that she does not have a dentist because she 

"can't afford it". When asked if you has consulted the reduced price dental 

clinics in [place] or [place], she stated that she didn't drive and doesn't know 

anyone with a car.


Pt. has noticed some swelling to the lymph nodes and her axillae. 


Pt. denies any sinus congestion. No sore throat. No fever or chills. No cough or

chest congestion.


She denies any abdominal pain, diarrhea, vomiting, or bloody stools.


Onset: Today


Location: Reports: Head, Chest, Generalized


Quality: Reports: Ache





- Related Data


                                    Allergies











Allergy/AdvReac Type Severity Reaction Status Date / Time


 


buspirone [From BuSpar] Allergy  Seizure Verified 08/12/20 20:01


 


tramadol Allergy  Seizure Verified 08/12/20 20:01











Home Meds: 


                                    Home Meds





Ibuprofen [Motrin] 800 mg PO Q8H PRN 12/30/17 [History]


QUEtiapine [SEROquel] 50 - 100 mg PO BEDTIME 12/30/17 [History]


ondansetron HCL [Ondansetron] 4 mg PO Q6H PRN 12/30/17 [History]


ClonazePAM [KlonoPIN] 0.5 - 1 mg PO BID PRN 04/17/19 [History]


Eszopiclone [Lunesta] 3 mg PO BEDTIME 04/17/19 [History]


Meloxicam [Mobic] 7.5 mg PO DAILY 04/17/19 [History]


Omeprazole Magnesium [Prilosec Otc] 40 mg PO BID 04/17/19 [History]


Propranolol [Inderal LA 24 Hr] 60 mg PO DAILY 04/17/19 [History]


Ranitidine [Zantac] 150 mg PO BEDTIME 04/17/19 [History]


Triamterene/Hydrochlorothiazid [Dyazide 37.5-25] 1 cap PO DAILY 04/17/19 

[History]











Past Medical History


Cardiovascular History: Reports: Hypertension


Psychiatric History: Reports: Addiction, Anxiety, Depression


Other Psychiatric History: Drug abuse





- Past Surgical History


Female  Surgical History: Reports: Hysterectomy





Social & Family History





- Family History


Family Medical History: Noncontributory





- Caffeine Use


Caffeine Use: Reports: None





ED ROS GENERAL





- Review of Systems


Review Of Systems: See Below


Constitutional: Reports: Fatigue


HEENT: Reports: Dental Pain


Respiratory: Reports: No Symptoms


Cardiovascular: Reports: Chest Pain, Blood Pressure Problem


Endocrine: Reports: No Symptoms


GI/Abdominal: Reports: No Symptoms


: Reports: No Symptoms


Musculoskeletal: Reports: No Symptoms


Skin: Reports: No Symptoms


Neurological: Reports: No Symptoms


Psychiatric: Reports: No Symptoms


Hematologic/Lymphatic: Reports: No Symptoms


Immunologic: Reports: No Symptoms





ED EXAM, GENERAL





- Physical Exam


Exam: See Below


Exam Limited By: No Limitations


General Appearance: Alert, WD/WN, No Apparent Distress


Eye Exam: Bilateral Eye: EOMI, Normal Fundi, Normal Inspection, PERRL


Throat/Mouth: Normal Lips, Normal Voice, No Airway Compromise, Other (severe 

dental caries and active periodontal infection in several places. No large 

abscesses noted. )


Head: Atraumatic, Normocephalic


Neck: Normal Inspection, Lymphadenopathy (L), Lymphadenopathy (R)


Respiratory/Chest: No Respiratory Distress, Lungs Clear, Normal Breath Sounds, 

No Accessory Muscle Use, Chest Non-Tender


Cardiovascular: Normal Peripheral Pulses, Regular Rate, Rhythm, No Edema, No 

Murmur


GI/Abdominal: Normal Bowel Sounds, Soft, Non-Tender, No Mass, Pelvis Stable


 (Female) Exam: Deferred


Rectal (Female) Exam: Deferred


Back Exam: Full Range of Motion


Extremities: Normal Inspection, Normal Range of Motion, Non-Tender, No Pedal 

Edema, Normal Capillary Refill


Neurological: Alert, Oriented, CN II-XII Intact, Normal Cognition, No 

Motor/Sensory Deficits


Psychiatric: Normal Affect, Normal Mood


Skin Exam: Warm, Dry, Intact, Normal Color, No Rash


Lymphatic: No Adenopathy





EKG INTERPRETATION


Rhythm: NSR


Axis: Normal


P-Wave: Present


QRS: Normal


ST-T: Normal


QT: Normal





Course





- Orders/Labs/Meds


Orders: 





                               Active Orders 24 hr











 Category Date Time Status


 


 EKG Documentation Completion [RC] STAT Care  08/12/20 16:32 Active


 


 Sodium Chloride 0.9% [Saline Flush] Med  08/12/20 16:32 Active





 10 ml FLUSH ASDIRECTED PRN   


 


 Peripheral IV Insertion Adult [OM.PC] Routine Oth  08/12/20 16:32 Ordered








                                Medication Orders





Sodium Chloride (Saline Flush)  10 ml FLUSH ASDIRECTED PRN


   PRN Reason: Keep Vein Open








Labs: 





                                Laboratory Tests











  08/12/20 08/12/20 08/12/20 Range/Units





  16:08 16:20 16:20 


 


WBC   7.5   (4.0-10.0)  x10^3/uL


 


RBC   4.26   (4.00-5.50)  x10^6/uL


 


Hgb   12.3  D   (12.0-16.0)  g/dL


 


Hct   36.5   (33.0-47.0)  %


 


MCV   85.7  D   (78.0-93.0)  fL


 


MCH   28.9   (26.0-32.0)  pg


 


MCHC   33.7   (32.0-36.0)  g/dL


 


RDW Coeff of Suzette   13.2   (10.0-15.0)  %


 


Plt Count   182   (130-400)  x10^3/uL


 


Neut % (Auto)   63.9   (50.0-80.0)  %


 


Lymph % (Auto)   27.5   (25.0-50.0)  %


 


Mono % (Auto)   6.2   (2.0-11.0)  %


 


Eos % (Auto)   2.1   (0.0-4.0)  %


 


Baso % (Auto)   0.3   (0.2-1.2)  %


 


PT    9.8  (9.5-12.3)  SEC


 


INR    0.9 L  (2.0-3.5)  


 


Sodium     (136-145)  mmol/L


 


Potassium     (3.5-5.1)  mmol/L


 


Chloride     ()  mmol/L


 


Carbon Dioxide     (21-32)  mmol/L


 


Anion Gap     (10-20)  mmol/L


 


BUN     (7-18)  mg/dL


 


Creatinine     (0.55-1.02)  mg/dL


 


Est Cr Clr Drug Dosing     


 


Estimated GFR (MDRD)     


 


Glucose     ()  mg/dL


 


Calcium     (8.5-10.1)  mg/dL


 


Corrected Calcium     (8.5-10.1)  mg/dL


 


Magnesium     (1.8-2.4)  mg/dL


 


Total Bilirubin     (0.2-1.0)  mg/dL


 


AST     (15-37)  U/L


 


ALT     (14-59)  U/L


 


Alkaline Phosphatase     ()  U/L


 


Troponin I     (<=0.056)  ng/mL


 


Total Protein     (6.4-8.2)  g/dL


 


Albumin     (3.4-5.0)  g/dL


 


Globulin     


 


Albumin/Globulin Ratio     


 


Urine Color     (YELLOW)  


 


Urine Appearance     (CLEAR)  


 


Urine pH     (5.0-8.0)  


 


Ur Specific Gravity     


 


Urine Protein     (NEGATIVE)  mg/dL


 


Urine Glucose (UA)     (NEGATIVE)  mg/dL


 


Urine Ketones     (NEGATIVE)  mg/dL


 


Urine Occult Blood     (NEGATIVE)  


 


Urine Nitrite     (NEGATIVE)  


 


Urine Bilirubin     (NEGATIVE)  


 


Urine Urobilinogen     (0.2)  EU/dL


 


Ur Leukocyte Esterase     (NEGATIVE)  


 


Urine RBC     (NOT SEEN)  /HPF


 


Urine WBC     (NOT SEEN)  /HPF


 


Ur Squamous Epith Cells     (NEGATIVE)  /HPF


 


Amorphous Sediment     


 


Urine Bacteria     (NEGATIVE)  /HPF


 


Urine Mucus     (NEGATIVE)  /LPF


 


Urine Opiates Screen     (NEGATIVE)  


 


Ur Buprenorphine Scrn     (NEGATIVE)  


 


Ur Oxycodone Screen     (NEGATIVE)  


 


Ur EDDP (Meth Metab)     (NEGATIVE)  


 


Urine Methadone Screen     (NEGATIVE)  


 


Ur Barbiturates Screen     (NEGATIVE)  


 


Ur Tricyclics Screen     (NEGATIVE)  


 


Ur Phencyclidine Scrn     (NEGATIVE)  


 


Ur Amphetamine Screen     (NEGATIVE)  


 


U Methamphetamines Scrn     (NEGATIVE)  


 


Urine MDMA Screen     (NEGATIVE)  


 


U Benzodiazepines Scrn     (NEGATIVE)  


 


U Cocaine Metab Screen     (NEGATIVE)  


 


U Marijuana (THC) Screen     (NEGATIVE)  


 


COVID-19 (THUY)  Negative    (NEGATIVE)  














  08/12/20 08/12/20 08/12/20 Range/Units





  16:20 17:15 17:15 


 


WBC     (4.0-10.0)  x10^3/uL


 


RBC     (4.00-5.50)  x10^6/uL


 


Hgb     (12.0-16.0)  g/dL


 


Hct     (33.0-47.0)  %


 


MCV     (78.0-93.0)  fL


 


MCH     (26.0-32.0)  pg


 


MCHC     (32.0-36.0)  g/dL


 


RDW Coeff of Suzette     (10.0-15.0)  %


 


Plt Count     (130-400)  x10^3/uL


 


Neut % (Auto)     (50.0-80.0)  %


 


Lymph % (Auto)     (25.0-50.0)  %


 


Mono % (Auto)     (2.0-11.0)  %


 


Eos % (Auto)     (0.0-4.0)  %


 


Baso % (Auto)     (0.2-1.2)  %


 


PT     (9.5-12.3)  SEC


 


INR     (2.0-3.5)  


 


Sodium  140    (136-145)  mmol/L


 


Potassium  3.2 L    (3.5-5.1)  mmol/L


 


Chloride  104    ()  mmol/L


 


Carbon Dioxide  31    (21-32)  mmol/L


 


Anion Gap  8.2 L    (10-20)  mmol/L


 


BUN  16    (7-18)  mg/dL


 


Creatinine  0.9    (0.55-1.02)  mg/dL


 


Est Cr Clr Drug Dosing  TNP    


 


Estimated GFR (MDRD)  > 60    


 


Glucose  107 H    ()  mg/dL


 


Calcium  7.9 L    (8.5-10.1)  mg/dL


 


Corrected Calcium  8.46 L    (8.5-10.1)  mg/dL


 


Magnesium  1.8    (1.8-2.4)  mg/dL


 


Total Bilirubin  0.5    (0.2-1.0)  mg/dL


 


AST  22    (15-37)  U/L


 


ALT  26    (14-59)  U/L


 


Alkaline Phosphatase  100    ()  U/L


 


Troponin I  < 0.017    (<=0.056)  ng/mL


 


Total Protein  7.0    (6.4-8.2)  g/dL


 


Albumin  3.3 L    (3.4-5.0)  g/dL


 


Globulin  3.7    


 


Albumin/Globulin Ratio  0.89    


 


Urine Color   Yellow   (YELLOW)  


 


Urine Appearance   Slightly cloudy H   (CLEAR)  


 


Urine pH   6.0   (5.0-8.0)  


 


Ur Specific Gravity   1.025   


 


Urine Protein   Negative   (NEGATIVE)  mg/dL


 


Urine Glucose (UA)   Negative   (NEGATIVE)  mg/dL


 


Urine Ketones   Trace H   (NEGATIVE)  mg/dL


 


Urine Occult Blood   Trace-intact H   (NEGATIVE)  


 


Urine Nitrite   Negative   (NEGATIVE)  


 


Urine Bilirubin   Small H   (NEGATIVE)  


 


Urine Urobilinogen   1.0   (0.2)  EU/dL


 


Ur Leukocyte Esterase   Negative   (NEGATIVE)  


 


Urine RBC   5-10 H   (NOT SEEN)  /HPF


 


Urine WBC   0-5   (NOT SEEN)  /HPF


 


Ur Squamous Epith Cells   Few H   (NEGATIVE)  /HPF


 


Amorphous Sediment   Few   


 


Urine Bacteria   Few H   (NEGATIVE)  /HPF


 


Urine Mucus   Many H   (NEGATIVE)  /LPF


 


Urine Opiates Screen    Negative  (NEGATIVE)  


 


Ur Buprenorphine Scrn    Negative  (NEGATIVE)  


 


Ur Oxycodone Screen    Negative  (NEGATIVE)  


 


Ur EDDP (Meth Metab)    Negative  (NEGATIVE)  


 


Urine Methadone Screen    Negative  (NEGATIVE)  


 


Ur Barbiturates Screen    Negative  (NEGATIVE)  


 


Ur Tricyclics Screen    Negative  (NEGATIVE)  


 


Ur Phencyclidine Scrn    Negative  (NEGATIVE)  


 


Ur Amphetamine Screen    Positive H  (NEGATIVE)  


 


U Methamphetamines Scrn    Positive H  (NEGATIVE)  


 


Urine MDMA Screen    Negative  (NEGATIVE)  


 


U Benzodiazepines Scrn    Negative  (NEGATIVE)  


 


U Cocaine Metab Screen    Negative  (NEGATIVE)  


 


U Marijuana (THC) Screen    Positive H  (NEGATIVE)  


 


COVID-19 ([facility]UY)     (NEGATIVE)  











Meds: 





Medications











Generic Name Dose Route Start Last Admin





  Trade Name Freq  PRN Reason Stop Dose Admin


 


Sodium Chloride  10 ml  08/12/20 16:32 





  Saline Flush  FLUSH  





  ASDIRECTED PRN  





  Keep Vein Open  














Discontinued Medications














Generic Name Dose Route Start Last Admin





  Trade Name Freq  PRN Reason Stop Dose Admin


 


Labetalol HCl  20 mg  08/12/20 16:34 





  Normodyne  IVPUSH  08/12/20 16:35 





  NOW ONE  





  Protocol  














- Re-Assessments/Exams


Free Text/Narrative Re-Assessment/Exam: 


Pt. was given labetolol 20mg IV. BP improved to the 160/100 range and patient 

reported she felt much better. Contacted pt. pharmacy. She has refills for her 

hypertensive medications. These were reordered and the staff was able to deliver

 them, as well as a script for Augmentin to ER so she could start them today 

(pharmacy closed prior to discharge of patient).





Departure





- Departure


Time of Disposition: 18:00


Disposition: Home, Self-Care 01


Condition: Good


Clinical Impression: 


 Hypertension, Methamphetamine abuse, Chest pain








- Discharge Information


Instructions:  Amoxicillin; Clavulanic Acid tablets, Dental Abscess, 

Easy-to-Read, Hypertension, Adult, Easy-to-Read


Referrals: 


Keenan Uriostegui PA-C [Primary Care Provider] - 


Additional Instructions: 


Augmentin 875mg


1 tab twice daily for 10 days





Restart your blood pressure medications. You need to get your BP under control. 

Your BP being elevated is likely contributing to how you are feeling.





You need to have numerous teeth removed. You are not going to be able to have 

then removed until your BP is better controlled.





Stop using methamphetamine. This contributes to your blood pressure being 

elevated as well, and makes you feel anxious.





Recheck in clinic in 10-14 days, sooner if not gradually improving.











- Problem List Review


Problem List Initiated/Reviewed/Updated: Yes





- My Orders


Last 24 Hours: 





My Active Orders





08/12/20 16:32


EKG Documentation Completion [RC] STAT 


Sodium Chloride 0.9% [Saline Flush]   10 ml FLUSH ASDIRECTED PRN 


Peripheral IV Insertion Adult [OM.PC] Routine 














- Assessment/Plan


Last 24 Hours: 





My Active Orders





08/12/20 16:32


EKG Documentation Completion [RC] STAT 


Sodium Chloride 0.9% [Saline Flush]   10 ml FLUSH ASDIRECTED PRN 


Peripheral IV Insertion Adult [OM.PC] Routine 











Plan: 





Augmentin 875mg


1 tab twice daily for 10 days





Restart your blood pressure medications. You need to get your BP under control. 

Your BP being elevated is likely contributing to how you are feeling.





You need to have numerous teeth removed. You are not going to be able to have 

then removed until your BP is better controlled.





Stop using methamphetamine. This contributes to your blood pressure being 

elevated as well, and makes you feel anxious.





Recheck in clinic in 10-14 days, sooner if not gradually improving.

## 2021-03-12 ENCOUNTER — HOSPITAL ENCOUNTER (EMERGENCY)
Dept: HOSPITAL 50 - VM.ED | Age: 51
Discharge: HOME | End: 2021-03-12
Payer: MEDICAID

## 2021-03-12 VITALS — SYSTOLIC BLOOD PRESSURE: 136 MMHG | DIASTOLIC BLOOD PRESSURE: 84 MMHG | HEART RATE: 75 BPM

## 2021-03-12 DIAGNOSIS — E87.6: ICD-10-CM

## 2021-03-12 DIAGNOSIS — Z79.899: ICD-10-CM

## 2021-03-12 DIAGNOSIS — Z72.0: ICD-10-CM

## 2021-03-12 DIAGNOSIS — E86.0: Primary | ICD-10-CM

## 2021-03-12 DIAGNOSIS — I10: ICD-10-CM

## 2021-03-12 DIAGNOSIS — F15.10: ICD-10-CM

## 2021-03-12 DIAGNOSIS — Z88.5: ICD-10-CM

## 2021-03-12 DIAGNOSIS — R11.2: ICD-10-CM

## 2021-03-12 DIAGNOSIS — Z88.8: ICD-10-CM

## 2021-03-12 LAB
ANION GAP SERPL CALC-SCNC: 12 MMOL/L (ref 5–15)
BUPRENORPHINE UR QL: NEGATIVE
CANNABINOIDS UR QL SCN: POSITIVE
CHLORIDE SERPL-SCNC: 103 MMOL/L (ref 98–107)
MDMA UR QL SCN: NEGATIVE
PCP UR QL SCN: NEGATIVE
SODIUM SERPL-SCNC: 141 MMOL/L (ref 136–145)

## 2021-03-12 NOTE — EDM.PDOC
ED HPI GENERAL MEDICAL PROBLEM





- General


Source of Information: Reports: Patient, EMS


  ** Face/Facial


Pain Score (Numeric/FACES): 7





<Geeta Barclay - Last Filed: 03/12/21 06:52>





<Jose Manuel Black - Last Filed: 03/12/21 08:26>





- General


Chief Complaint: Gastrointestinal Problem


Stated Complaint: nausea/vomiting


Time Seen by Provider: 03/12/21 06:24





- History of Present Illness


INITIAL COMMENTS - FREE TEXT/NARRATIVE: 





Arlin is a 51 y/o female who is brought to the ER by EMS after she threw up this

AM 5 times. She reports that yesterday evening she felt dizzy and could hardly 

walk and then she became nauseated. She did not throw up until this AM. No fever

or diarrhea. She is also currently on Clindamycin for a dental infection. She 

does admit to last using marijauna and meth 4 days ago. She also has not been 

taking her blood pressure meds regularly. Said she sometimes feels palpitations.

(Geeta Barclay)





- Related Data


                                    Allergies











Allergy/AdvReac Type Severity Reaction Status Date / Time


 


buspirone [From BuSpar] Allergy  Seizure Verified 03/12/21 06:13


 


tramadol Allergy  Seizure Verified 03/12/21 06:13











Home Meds: 


                                    Home Meds





Ibuprofen [Motrin] 800 mg PO Q8H PRN 12/30/17 [History]


QUEtiapine [SEROquel] 50 - 100 mg PO BEDTIME 12/30/17 [History]


ondansetron HCL [Ondansetron] 4 mg PO Q6H PRN 12/30/17 [History]


ClonazePAM [KlonoPIN] 0.5 - 1 mg PO BID PRN 04/17/19 [History]


Eszopiclone [Lunesta] 3 mg PO BEDTIME 04/17/19 [History]


Meloxicam [Mobic] 7.5 mg PO DAILY 04/17/19 [History]


Omeprazole Magnesium [Prilosec Otc] 40 mg PO BID 04/17/19 [History]


Propranolol [Inderal LA 24 Hr] 60 mg PO DAILY 04/17/19 [History]


Ranitidine [Zantac] 150 mg PO BEDTIME 04/17/19 [History]


Triamterene/Hydrochlorothiazid [Dyazide 37.5-25] 1 cap PO DAILY 04/17/19 

[History]


Meclizine [Antivert] 25 mg PO TID PRN #9 tab.chew 03/12/21 [Rx]


Potassium Chloride 20 meq PO DAILY #5 tablet.er 03/12/21 [Rx]











Past Medical History


Cardiovascular History: Reports: Hypertension


Psychiatric History: Reports: Addiction, Anxiety, Depression


Other Psychiatric History: Drug abuse





- Past Surgical History


Female  Surgical History: Reports: Hysterectomy


Musculoskeletal Surgical History: Reports: Shoulder Surgery





<Geeta Barclay Filed: 03/12/21 06:52>





Social & Family History





- Family History


Family Medical History: No Pertinent Family History





- Tobacco Use


Tobacco Use Status *Q: Current Every Day Tobacco User


Years of Tobacco use: 20


Packs/Tins Daily: 0.5





- Caffeine Use


Caffeine Use: Reports: None





- Recreational Drug Use


Recreational Drug Use: Yes


Drug Use in Last 12 Months: Yes


Recreational Drug Type: Reports: Marijuana/Hashish, Methamphetamine


Recreational Drug Use Frequency: Weekly


Recreational Drug Last Use: 4 days ago





<Geeta Barclay Filed: 03/12/21 06:52>





Review of Systems





- Review of Systems


Review Of Systems: See Below


Constitutional: Reports: Weakness


Eyes: Reports: No Symptoms


Ears: Reports: No Symptoms


Nose: Reports: No Symptoms


Mouth/Throat: Reports: No Symptoms


Respiratory: Reports: No Symptoms


Cardiovascular: Reports: Palpitations


GI/Abdominal: Reports: Nausea, Vomiting


Genitourinary: Reports: No Symptoms


Musculoskeletal: Reports: No Symptoms


Skin: Reports: No Symptoms


Neurological: Reports: Dizziness





<Geeta Barclay Filed: 03/12/21 06:52>





ED EXAM, GENERAL





- Physical Exam


Exam: See Below


General Appearance: Alert, WD/WN, No Apparent Distress (Adult female.)


Eye Exam: Bilateral Eye: PERRL


Ears: Normal External Exam, Normal Canal, Hearing Grossly Normal


Nose: Normal Inspection, Normal Mucosa, No Blood


Throat/Mouth: Normal Inspection, Normal Lips, Normal Voice


Head: Atraumatic, Normocephalic


Neck: Normal Inspection, Supple, Non-Tender


Respiratory/Chest: No Respiratory Distress, Lungs Clear, Chest Non-Tender


Cardiovascular: Normal Peripheral Pulses, Regular Rate, Rhythm, No Murmur


GI/Abdominal: Normal Bowel Sounds, Soft, Non-Tender


 (Female) Exam: Deferred


Rectal (Female) Exam: Deferred


Back Exam: Normal Inspection


Extremities: Normal Inspection, Normal Range of Motion, Normal Capillary Refill


Neurological: Alert, Oriented, CN II-XII Intact, Normal Cognition, No 

Motor/Sensory Deficits


Psychiatric: Normal Affect, Normal Mood


Skin Exam: Warm, Dry, Intact, Normal Color, No Rash





<Geeta Barclay - Last Filed: 03/12/21 06:52>


  ** #1 Interpretation


EKG Date: 03/12/21


Time: 06:45


Rhythm: NSR


Rate (Beats/Min): 61


Axis: Normal


P-Wave: Present


QRS: Normal


ST-T: Normal


QT: Normal





<Geeta Barclay - Last Filed: 03/12/21 06:52>





Course





<Geeta Barclay - Last Filed: 03/12/21 06:52>





<Jose Manuel Black - Last Filed: 03/12/21 08:26>





- Vital Signs


Text/Narrative:: 





0624 The patient was seen by the CNP. Labs and EKG ordered. She was given a 

liter of LR and Compazine 10mg IVP.





0700 Report given to oncoming provider, Deonte Black CNP. (Geeta Barclay)


Last Recorded V/S: 





                                Last Vital Signs











Temp  96.0 F L  03/12/21 06:14


 


Pulse  75   03/12/21 08:01


 


Resp  18   03/12/21 07:25


 


BP  136/84   03/12/21 08:01


 


Pulse Ox  98   03/12/21 07:25














- Orders/Labs/Meds


Orders: 





                               Active Orders 24 hr











 Category Date Time Status


 


 EKG Documentation Completion [RC] STAT Care  03/12/21 06:36 Active


 


 Sodium Chloride 0.9% [Saline Flush] Med  03/12/21 06:29 Active





 10 ml FLUSH ASDIRECTED PRN   


 


 Saline Lock Insert [OM.PC] Stat Oth  03/12/21 06:29 Ordered











Labs: 





                                Laboratory Tests











  03/12/21 03/12/21 03/12/21 Range/Units





  06:40 06:40 06:40 


 


WBC  8.1    (4.0-10.0)  x10^3/uL


 


RBC  4.77    (4.00-5.50)  x10^6/uL


 


Hgb  13.9  D    (12.0-16.0)  g/dL


 


Hct  41.3    (33.0-47.0)  %


 


MCV  86.6    (78.0-93.0)  fL


 


MCH  29.1    (26.0-32.0)  pg


 


MCHC  33.7    (32.0-36.0)  g/dL


 


RDW Coeff of Suzette  13.7    (10.0-15.0)  %


 


Plt Count  185    (130-400)  x10^3/uL


 


Neut % (Auto)  77.0    (50.0-80.0)  %


 


Lymph % (Auto)  15.6 L    (25.0-50.0)  %


 


Mono % (Auto)  5.3    (2.0-11.0)  %


 


Eos % (Auto)  2.0    (0.0-4.0)  %


 


Baso % (Auto)  0.1 L    (0.2-1.2)  %


 


Sodium   141   (136-145)  mmol/L


 


Potassium   3.0 L   (3.5-5.1)  mmol/L


 


Chloride   103   ()  mmol/L


 


Carbon Dioxide   29   (21-32)  mmol/L


 


Anion Gap   12.0   (5-15)  mmol/L


 


BUN   12   (7-18)  mg/dL


 


Creatinine   0.7   (0.55-1.02)  mg/dL


 


Est Cr Clr Drug Dosing   98.07   mL/min


 


Estimated GFR (MDRD)   > 60   


 


Glucose   104   ()  mg/dL


 


Calcium   8.4 L   (8.5-10.1)  mg/dL


 


Corrected Calcium   8.64   (8.5-10.1)  mg/dL


 


Magnesium   2.0   (1.8-2.4)  mg/dL


 


Total Bilirubin   0.7   (0.2-1.0)  mg/dL


 


AST   31   (15-37)  U/L


 


ALT   38   (14-59)  U/L


 


Alkaline Phosphatase   113   ()  U/L


 


Troponin I High Sens    7  (<=51)  ng/L


 


C-Reactive Protein   < 0.2   (<=0.9)  mg/dL


 


Total Protein   7.7   (6.4-8.2)  g/dL


 


Albumin   3.7   (3.4-5.0)  g/dL


 


Globulin   4.0   


 


Albumin/Globulin Ratio   0.93   


 


Amylase   68   ()  U/L


 


Lipase   218   ()  U/L


 


Urine Opiates Screen     (NEGATIVE)  


 


Ur Buprenorphine Scrn     (NEGATIVE)  


 


Ur Oxycodone Screen     (NEGATIVE)  


 


Ur EDDP (Meth Metab)     (NEGATIVE)  


 


Urine Methadone Screen     (NEGATIVE)  


 


Ur Barbituates Screen     (NEGATIVE)  


 


Ur Tricyclics Screen     (NEGATIVE)  


 


Ur Phencyclidine Scrn     (NEGATIVE)  


 


Ur Amphetamines Screen     (NEGATIVE)  


 


U Methamphetamines Scrn     (NEGATIVE)  


 


Urine MDMA Screen     (NEGATIVE)  


 


U Benzodiazepines Scrn     (NEGATIVE)  


 


Urine Cocaine Screen     (NEGATIVE)  


 


U Marijuana (THC) Screen     (NEGATIVE)  


 


Ethyl Alcohol   < 3   (0-3)  mg/dL














  03/12/21 Range/Units





  07:19 


 


WBC   (4.0-10.0)  x10^3/uL


 


RBC   (4.00-5.50)  x10^6/uL


 


Hgb   (12.0-16.0)  g/dL


 


Hct   (33.0-47.0)  %


 


MCV   (78.0-93.0)  fL


 


MCH   (26.0-32.0)  pg


 


MCHC   (32.0-36.0)  g/dL


 


RDW Coeff of Suzette   (10.0-15.0)  %


 


Plt Count   (130-400)  x10^3/uL


 


Neut % (Auto)   (50.0-80.0)  %


 


Lymph % (Auto)   (25.0-50.0)  %


 


Mono % (Auto)   (2.0-11.0)  %


 


Eos % (Auto)   (0.0-4.0)  %


 


Baso % (Auto)   (0.2-1.2)  %


 


Sodium   (136-145)  mmol/L


 


Potassium   (3.5-5.1)  mmol/L


 


Chloride   ()  mmol/L


 


Carbon Dioxide   (21-32)  mmol/L


 


Anion Gap   (5-15)  mmol/L


 


BUN   (7-18)  mg/dL


 


Creatinine   (0.55-1.02)  mg/dL


 


Est Cr Clr Drug Dosing   mL/min


 


Estimated GFR (MDRD)   


 


Glucose   ()  mg/dL


 


Calcium   (8.5-10.1)  mg/dL


 


Corrected Calcium   (8.5-10.1)  mg/dL


 


Magnesium   (1.8-2.4)  mg/dL


 


Total Bilirubin   (0.2-1.0)  mg/dL


 


AST   (15-37)  U/L


 


ALT   (14-59)  U/L


 


Alkaline Phosphatase   ()  U/L


 


Troponin I High Sens   (<=51)  ng/L


 


C-Reactive Protein   (<=0.9)  mg/dL


 


Total Protein   (6.4-8.2)  g/dL


 


Albumin   (3.4-5.0)  g/dL


 


Globulin   


 


Albumin/Globulin Ratio   


 


Amylase   ()  U/L


 


Lipase   ()  U/L


 


Urine Opiates Screen  Negative  (NEGATIVE)  


 


Ur Buprenorphine Scrn  Negative  (NEGATIVE)  


 


Ur Oxycodone Screen  Negative  (NEGATIVE)  


 


Ur EDDP (Meth Metab)  Negative  (NEGATIVE)  


 


Urine Methadone Screen  Negative  (NEGATIVE)  


 


Ur Barbituates Screen  Negative  (NEGATIVE)  


 


Ur Tricyclics Screen  Negative  (NEGATIVE)  


 


Ur Phencyclidine Scrn  Negative  (NEGATIVE)  


 


Ur Amphetamines Screen  Positive H  (NEGATIVE)  


 


U Methamphetamines Scrn  Positive H  (NEGATIVE)  


 


Urine MDMA Screen  Negative  (NEGATIVE)  


 


U Benzodiazepines Scrn  Negative  (NEGATIVE)  


 


Urine Cocaine Screen  Negative  (NEGATIVE)  


 


U Marijuana (THC) Screen  Positive H  (NEGATIVE)  


 


Ethyl Alcohol   (0-3)  mg/dL














- Re-Assessments/Exams


Free Text/Narrative Re-Assessment/Exam: 





03/12/21 08:25


I assumed care of this patient at shift change 0 [phone]





She did receive fluids and Compazine.





She also received potassium oral liquid solution 40 mEq for potassium of 3.0 

with a normal magnesium.





She now rests comfortably on the bed.  Her nausea and vomiting is resolved.  She

 has a small amount of lightheadedness upon standing there is no vertigo 

sensation.  Her blood pressure is improved likely down to the 130s systolically 

80s diastolically.  She does feel quite a bit better she has no abdominal pain. 

 We will discharge her home with Antivert for dizziness nausea vomiting as well 

as potassium supplementation.  She needs to recheck her potassium in the clinic 

in the next 5 days.  She is comfortable with this plan and her questions are 

answered. (Jose Manuel Black)





Departure





<Geeta Barclay - Last Filed: 03/12/21 06:52>





- Departure


Time of Disposition: 08:11





<Jose Manuel Black - Last Filed: 03/12/21 08:26>





- Departure


Disposition: Home, Self-Care 01


Clinical Impression: 


 Methamphetamine abuse, Hypokalemia, Dizziness, Dehydration





Nausea & vomiting


Qualifiers:


 Vomiting type: unspecified Vomiting Intractability: unspecified Qualified 

Code(s): R11.2 - Nausea with vomiting, unspecified








- Discharge Information


Instructions:  Nausea and Vomiting, Adult, Easy-to-Read, Dehydration, Adult, 

Easy-to-Read, Dehydration, Elderly, Easy-to-Read


Forms:  ED Department Discharge


Additional Instructions: 


Home today rest. 


Plenty of fluids especially electrolyte containing fluids like Gatorade and or 

Powerade. 


Make sure and eat regular small frequent meals. 


For dizziness nausea Antivert 25 mg 1 tablet three times a day as needed. Rx 

sent to [facility]. 


See the [facility] for assistance with your recreational drug usage if

 you are interested. 


For your potassium, 1 tablet daily for the next 5 days. Rx to [facility]. 


Make sure and take your regularly prescribed medications daily. 


Return to the ED if new or worsening symptoms. 


Follow up with PCP in the next 5 days for recheck of your labs. 





Sepsis Event Note (ED)





- Evaluation


Sepsis Screening Result: No Definite Risk





<Geeta Barclay - Last Filed: 03/12/21 06:52>





- Focused Exam


Vital Signs: 





                                   Vital Signs











  Temp Pulse Resp BP Pulse Ox


 


 03/12/21 08:01   75   136/84 


 


 03/12/21 07:25   74  18  183/95 H  98


 


 03/12/21 06:27   64  14  211/121 H 


 


 03/12/21 06:14  96.0 F L  63  16  214/127 H  100